# Patient Record
Sex: MALE | Race: WHITE | NOT HISPANIC OR LATINO | Employment: FULL TIME | URBAN - NONMETROPOLITAN AREA
[De-identification: names, ages, dates, MRNs, and addresses within clinical notes are randomized per-mention and may not be internally consistent; named-entity substitution may affect disease eponyms.]

---
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
## 2021-09-17 PROCEDURE — U0004 COV-19 TEST NON-CDC HGH THRU: HCPCS | Performed by: NURSE PRACTITIONER

## 2021-09-19 PROCEDURE — U0004 COV-19 TEST NON-CDC HGH THRU: HCPCS | Performed by: NURSE PRACTITIONER

## 2022-08-14 ENCOUNTER — HOSPITAL ENCOUNTER (EMERGENCY)
Facility: HOSPITAL | Age: 22
Discharge: HOME | End: 2022-08-15
Admitting: STUDENT IN AN ORGANIZED HEALTH CARE EDUCATION/TRAINING PROGRAM

## 2022-08-14 DIAGNOSIS — Z20.822 ENCOUNTER FOR SCREENING LABORATORY TESTING FOR COVID-19 VIRUS: ICD-10-CM

## 2022-08-14 DIAGNOSIS — Z20.822 CLOSE EXPOSURE TO COVID-19 VIRUS: Primary | ICD-10-CM

## 2022-08-14 LAB — SARS-COV-2 RNA PNL SPEC NAA+PROBE: NOT DETECTED

## 2022-08-14 PROCEDURE — C9803 HOPD COVID-19 SPEC COLLECT: HCPCS

## 2022-08-14 PROCEDURE — 87635 SARS-COV-2 COVID-19 AMP PRB: CPT | Performed by: STUDENT IN AN ORGANIZED HEALTH CARE EDUCATION/TRAINING PROGRAM

## 2022-08-14 PROCEDURE — 99283 EMERGENCY DEPT VISIT LOW MDM: CPT

## 2022-08-15 VITALS
DIASTOLIC BLOOD PRESSURE: 89 MMHG | BODY MASS INDEX: 37.28 KG/M2 | RESPIRATION RATE: 20 BRPM | SYSTOLIC BLOOD PRESSURE: 144 MMHG | WEIGHT: 232 LBS | HEART RATE: 112 BPM | TEMPERATURE: 98.9 F | OXYGEN SATURATION: 99 % | HEIGHT: 66 IN

## 2022-08-15 NOTE — DISCHARGE INSTRUCTIONS
Recommend isolation for 5 days and mask wearing for 10 days.  Follow-up with primary care provider.  Return to ED for any acute worsening signs or symptoms such as chest pain, shortness of breath, persistent vomiting etc.

## 2022-08-15 NOTE — ED PROVIDER NOTES
"Subjective   History of Present Illness    Patient is a pleasant 21-year-old gentleman with chief complaint of COVID symptoms consisting of myalgias, other arthralgias, scant cough, low-grade fever, head congestion.  The patient describes his girlfriend has been symptomatic with COVID for the past 3 to 4 days.  She tested positive.  Patient is concerned that he is COVID himself that is scheduled for work.  He came to the ER to complete the test.  He denies a history of asthma or pneumonia.  He does smoke cigarettes.  He reports he is otherwise healthy.  He denies being immunocompromise.  Denies any vomiting or diarrhea.    Review of Systems   HENT: Positive for congestion and rhinorrhea.    Respiratory: Positive for cough.    Gastrointestinal: Negative.    Musculoskeletal: Positive for arthralgias and myalgias.   Skin: Negative.    Neurological: Negative.    All other systems reviewed and are negative.      No past medical history on file.    Allergies   Allergen Reactions   • Strawberry Hives       No past surgical history on file.    No family history on file.    Social History     Socioeconomic History   • Marital status: Single   Tobacco Use   • Smoking status: Current Every Day Smoker     Packs/day: 0.25     Types: Cigarettes   • Smokeless tobacco: Never Used   • Tobacco comment: 7 cigarettes a day   Substance and Sexual Activity   • Alcohol use: No   • Drug use: No   • Sexual activity: Defer       Prior to Admission medications    Not on File       Medications - No data to display    /86 (BP Location: Right arm, Patient Position: Sitting)   Pulse 109   Temp 99.1 °F (37.3 °C) (Oral)   Resp 18   Ht 167.6 cm (66\")   Wt 105 kg (232 lb)   SpO2 100%   BMI 37.45 kg/m²       Objective   Physical Exam  Vitals reviewed.   Constitutional:       Appearance: He is well-developed.   HENT:      Head: Normocephalic and atraumatic.      Right Ear: External ear normal.      Left Ear: External ear normal.      Nose: " Nose normal.   Eyes:      Conjunctiva/sclera: Conjunctivae normal.      Pupils: Pupils are equal, round, and reactive to light.   Neck:      Trachea: No tracheal deviation.   Cardiovascular:      Rate and Rhythm: Normal rate and regular rhythm.      Heart sounds: Normal heart sounds. No murmur heard.    No friction rub. No gallop.   Pulmonary:      Effort: Pulmonary effort is normal. No respiratory distress.      Breath sounds: Normal breath sounds. No wheezing or rales.   Chest:      Chest wall: No tenderness.   Musculoskeletal:         General: No tenderness or deformity. Normal range of motion.      Cervical back: Normal range of motion and neck supple.   Skin:     General: Skin is warm and dry.      Coloration: Skin is not pale.      Findings: No erythema or rash.   Neurological:      Mental Status: He is alert and oriented to person, place, and time.      Deep Tendon Reflexes: Reflexes are normal and symmetric.   Psychiatric:         Mood and Affect: Mood normal.         Behavior: Behavior normal.         Thought Content: Thought content normal.         Judgment: Judgment normal.         Procedures         Lab Results (last 24 hours)     Procedure Component Value Units Date/Time    COVID PRE-OP / PRE-PROCEDURE SCREENING ORDER (NO ISOLATION) - Swab, Nasal Cavity [[id number]]  (Normal) Collected: 08/14/22 2207    Specimen: Swab from Nasal Cavity Updated: 08/14/22 2245    Narrative:      The following orders were created for panel order COVID PRE-OP / PRE-PROCEDURE SCREENING ORDER (NO ISOLATION) - Swab, Nasal Cavity.  Procedure                               Abnormality         Status                     ---------                               -----------         ------                     COVID-19,[facility]ISH[facility]...[[id number]]  Normal              Final result                 Please view results for these tests on the individual orders.    COVID-19,[facility] IN-HOUSE,Nasal Swab No Transport Media 3-4 HR TAT - Swab,  Nasal Cavity [[id number]]  (Normal) Collected: 08/14/22 2207    Specimen: Swab from Nasal Cavity Updated: 08/14/22 2245     COVID19 Not Detected    Narrative:      Fact sheet for providers: https://www.fda.gov/media/455293/download     Fact sheet for patients: https://www.fda.gov/media/644259/download    Test performed by PCR.    Consider negative results in combination with clinical observations, patient history, and epidemiological information.          No results found.    ED Course  ED Course as of 08/14/22 2342   Sun Aug 14, 2022   2341 Patient has been educated that his COVID-19 study is not detected.  However, the patient has had close contact with him with COVID and is symptomatic.  I do believe that more likely he does have COVID after all.  I have advised and treat accordingly.  He is to continue to isolate for 5 days and wear a mask for 10 days.  He is welcome to complete further outpatient testing if he is concerned that it may turn part positive.  Treatment with same regardless.  Treat supportively.  Advised follow with primary care provider.  Strict return precautions advised to the ED. [TK]      ED Course User Index  [TK] Yun Harkins PA          [facility]    Final diagnoses:   Close exposure to COVID-19 virus   Encounter for screening laboratory testing for COVID-19 virus          Yun Harkins PA  08/14/22 2342

## 2023-01-11 ENCOUNTER — HOSPITAL ENCOUNTER (EMERGENCY)
Facility: HOSPITAL | Age: 23
Discharge: HOME | End: 2023-01-11
Admitting: FAMILY MEDICINE
Payer: COMMERCIAL

## 2023-01-11 VITALS
WEIGHT: 244 LBS | OXYGEN SATURATION: 100 % | DIASTOLIC BLOOD PRESSURE: 76 MMHG | HEART RATE: 118 BPM | SYSTOLIC BLOOD PRESSURE: 110 MMHG | TEMPERATURE: 98.6 F | BODY MASS INDEX: 39.21 KG/M2 | HEIGHT: 66 IN | RESPIRATION RATE: 16 BRPM

## 2023-01-11 DIAGNOSIS — J02.9 PHARYNGITIS, UNSPECIFIED ETIOLOGY: Primary | ICD-10-CM

## 2023-01-11 LAB
FLUAV RNA RESP QL NAA+PROBE: NOT DETECTED
FLUBV RNA RESP QL NAA+PROBE: NOT DETECTED
SARS-COV-2 RNA RESP QL NAA+PROBE: NOT DETECTED

## 2023-01-11 PROCEDURE — 87081 CULTURE SCREEN ONLY: CPT | Performed by: NURSE PRACTITIONER

## 2023-01-11 PROCEDURE — 99283 EMERGENCY DEPT VISIT LOW MDM: CPT

## 2023-01-11 PROCEDURE — 87636 SARSCOV2 & INF A&B AMP PRB: CPT | Performed by: NURSE PRACTITIONER

## 2023-01-11 PROCEDURE — C9803 HOPD COVID-19 SPEC COLLECT: HCPCS

## 2023-01-11 RX ORDER — AMOXICILLIN 500 MG/1
500 CAPSULE ORAL 2 TIMES DAILY
Qty: 20 CAPSULE | Refills: 0 | Status: SHIPPED | OUTPATIENT
Start: 2023-01-11 | End: 2023-01-21

## 2023-01-11 NOTE — DISCHARGE INSTRUCTIONS
We are out of strep swabs therefore all we can do is a throat culture. We will treat for possible strep throat. If culture is negative you likely have viral illness. Today influenza and covid were both negative. F/u with pcp with no improvements.

## 2023-01-11 NOTE — Clinical Note
[facility] EMERGENCY DEPARTMENT  [facility] [facility] [address]  Phone: [phone]    Panchito Taylor was seen and treated in our emergency department on 1/11/2023.  He may return to work on 01/16/2023.         Thank you for choosing [facility].    Mitali Rizo, APRN

## 2023-01-12 NOTE — ED PROVIDER NOTES
Subjective   History of Present Illness  Patient is a 22-year-old male who presents to the ER with chief complaints of flulike symptoms.  Patient states that he has had a sore throat, cough with congestion, headache, body aches, back and leg discomfort and subjective fever for the past few days.  He has had no nausea, vomiting, or diarrhea.  He denies any shortness of breath or chest pain.  He has no abdominal pain.  Due to symptoms he came to the ER for evaluation and treatment.  Patient has no pertinent past medical history.        Review of Systems   Constitutional: Positive for activity change and fatigue.   HENT: Positive for congestion and sore throat.    Eyes: Negative.    Respiratory: Positive for cough.    Cardiovascular: Negative.  Negative for chest pain.   Gastrointestinal: Negative.  Negative for abdominal pain, diarrhea, nausea and vomiting.   Genitourinary: Negative.  Negative for dysuria.   Musculoskeletal: Positive for myalgias.   Skin: Negative.    Neurological: Positive for headaches.   All other systems reviewed and are negative.      History reviewed. No pertinent past medical history.    Allergies   Allergen Reactions   • Strawberry Hives       History reviewed. No pertinent surgical history.    History reviewed. No pertinent family history.    Social History     Socioeconomic History   • Marital status: Single   Tobacco Use   • Smoking status: Every Day     Packs/day: 0.25     Types: Cigarettes   • Smokeless tobacco: Never   • Tobacco comments:     7 cigarettes a day   Substance and Sexual Activity   • Alcohol use: No   • Drug use: No   • Sexual activity: Defer           Objective   Physical Exam  Vitals and nursing note reviewed.   Constitutional:       General: He is not in acute distress.     Appearance: He is well-developed. He is not ill-appearing or diaphoretic.   HENT:      Head: Normocephalic and atraumatic.      Right Ear: Tympanic membrane, ear canal and external ear normal.       Left Ear: Tympanic membrane, ear canal and external ear normal.      Nose: Nose normal.      Mouth/Throat:      Mouth: Mucous membranes are moist.      Pharynx: Posterior oropharyngeal erythema present. No oropharyngeal exudate.      Comments: No exudate or tonsillar abscess identified, diffuse erythema noted to the oropharynx  Eyes:      General: No scleral icterus.     Extraocular Movements: Extraocular movements intact.      Conjunctiva/sclera: Conjunctivae normal.      Pupils: Pupils are equal, round, and reactive to light.   Neck:      Thyroid: No thyromegaly.      Vascular: No JVD.      Comments: No meningismus sounds noted  Cardiovascular:      Rate and Rhythm: Normal rate and regular rhythm.      Heart sounds: Normal heart sounds. No murmur heard.  Pulmonary:      Effort: Pulmonary effort is normal. No respiratory distress.      Breath sounds: Normal breath sounds. No wheezing or rales.   Chest:      Chest wall: No tenderness.   Abdominal:      General: Bowel sounds are normal. There is no distension.      Palpations: Abdomen is soft. There is no mass.      Tenderness: There is no abdominal tenderness. There is no guarding or rebound.   Musculoskeletal:         General: Normal range of motion.      Cervical back: Normal range of motion and neck supple. No rigidity.   Lymphadenopathy:      Cervical: No cervical adenopathy.   Skin:     General: Skin is warm and dry.      Capillary Refill: Capillary refill takes less than 2 seconds.      Coloration: Skin is not pale.      Findings: No erythema or rash.   Neurological:      General: No focal deficit present.      Mental Status: He is alert and oriented to person, place, and time.      Cranial Nerves: No cranial nerve deficit.      Coordination: Coordination normal.      Deep Tendon Reflexes: Reflexes are normal and symmetric.   Psychiatric:         Behavior: Behavior normal.         Thought Content: Thought content normal.         Judgment: Judgment normal.          Procedures           ED Course   No orders to display     Labs Reviewed   COVID-19 AND FLU A/B, NP SWAB IN TRANSPORT MEDIA 8-12 HR TAT - Normal    Narrative:     Fact sheet for providers: https://www.fda.gov/media/298026/download    Fact sheet for patients: https://www.fda.gov/media/714198/download    Test performed by PCR.   BETA HEMOLYTIC STREP CULTURE, THROAT - Normal                                              Medical Decision Making  Patient is a 22-year-old male who presents to the ER with chief complaints of flulike symptoms.  Patient states that he has had a sore throat, cough with congestion, headache, body aches, back and leg discomfort and subjective fever for the past few days.  He has had no nausea, vomiting, or diarrhea.  He denies any shortness of breath or chest pain.  He has no abdominal pain.  Due to symptoms he came to the ER for evaluation and treatment.  Patient has no pertinent past medical history.  Medical decision making: Influenza, COVID, strep throat, pneumonia, viral illness, no    Pharyngitis, unspecified etiology: complicated acute illness or injury     Details: We are currently out of rapid strep swabs and are using beta culture swabs.  Patient clinically appears to have strep throat.  We will treat accordingly and he will need follow-up with PCP for reevaluation.  He is not hypoxic and in no distress.  COVID and influenza swabs were negative.  Lungs were clear.  Amount and/or Complexity of Data Reviewed  Labs:  Decision-making details documented in ED Course.  Radiology:  Decision-making details documented in ED Course.  ECG/medicine tests:  Decision-making details documented in ED Course.      Risk  Prescription drug management.          Final diagnoses:   Pharyngitis, unspecified etiology       ED Disposition  ED Disposition     ED Disposition   Discharge    Condition   Good    Comment   --             No follow-up provider specified.       Medication List      New  Prescriptions    amoxicillin 500 MG capsule  Commonly known as: AMOXIL  Take 1 capsule by mouth 2 (Two) Times a Day for 10 days.           Where to Get Your Medications      These medications were sent to [facility] [address] [place] [address] [facility] - [phone]  - [phone] [address] [address]    Phone: [phone]   · amoxicillin 500 MG capsule          Mitali Rizo, APRN  01/13/23 1536

## 2023-01-13 LAB — BACTERIA SPEC AEROBE CULT: NORMAL
